# Patient Record
Sex: FEMALE | Race: WHITE | HISPANIC OR LATINO
[De-identification: names, ages, dates, MRNs, and addresses within clinical notes are randomized per-mention and may not be internally consistent; named-entity substitution may affect disease eponyms.]

---

## 2023-03-09 ENCOUNTER — P2P PATIENT RECORD (OUTPATIENT)
Age: 78
End: 2023-03-09

## 2023-03-17 ENCOUNTER — OFFICE VISIT (OUTPATIENT)
Dept: URBAN - METROPOLITAN AREA CLINIC 60 | Facility: CLINIC | Age: 78
End: 2023-03-17
Payer: MEDICARE

## 2023-03-17 ENCOUNTER — WEB ENCOUNTER (OUTPATIENT)
Dept: URBAN - METROPOLITAN AREA CLINIC 60 | Facility: CLINIC | Age: 78
End: 2023-03-17

## 2023-03-17 VITALS
SYSTOLIC BLOOD PRESSURE: 140 MMHG | OXYGEN SATURATION: 98 % | RESPIRATION RATE: 18 BRPM | HEIGHT: 62 IN | TEMPERATURE: 98.9 F | HEART RATE: 114 BPM | DIASTOLIC BLOOD PRESSURE: 80 MMHG | WEIGHT: 248 LBS | BODY MASS INDEX: 45.64 KG/M2

## 2023-03-17 DIAGNOSIS — K92.1 HEMATOCHEZIA: ICD-10-CM

## 2023-03-17 DIAGNOSIS — K62.5 RECTAL BLEEDING: ICD-10-CM

## 2023-03-17 PROCEDURE — 99244 OFF/OP CNSLTJ NEW/EST MOD 40: CPT | Performed by: NURSE PRACTITIONER

## 2023-03-17 PROCEDURE — 99204 OFFICE O/P NEW MOD 45 MIN: CPT | Performed by: NURSE PRACTITIONER

## 2023-03-17 RX ORDER — LEVOTHYROXINE SODIUM 0.03 MG/1
TAKE 1 TABLET BY MOUTH EVERY DAY TABLET ORAL
Qty: 90 EACH | Refills: 0 | Status: ACTIVE | COMMUNITY

## 2023-03-17 RX ORDER — AMLODIPINE BESYLATE 5 MG/1
TABLET ORAL
Qty: 180 TABLET | Status: ACTIVE | COMMUNITY

## 2023-03-17 RX ORDER — METFORMIN ER 500 MG 500 MG/1
TABLET ORAL
Qty: 90 TABLET | Status: ACTIVE | COMMUNITY

## 2023-03-17 RX ORDER — TAMOXIFEN CITRATE 10 MG/1
TAKE 1/2 TABLET BY MOUTH EVERY DAY TABLET, FILM COATED ORAL
Qty: 42 EACH | Refills: 3 | Status: ACTIVE | COMMUNITY

## 2023-03-17 NOTE — PHYSICAL EXAM CHEST:
No lesions,  no deformities, chest wall non-tender,  no masses present, breathing is unlabored without, normal breath sounds.  , No lesions,  no deformities, chest wall non-tender,  no masses present, breathing is unlabored without, normal breath sounds.

## 2023-03-17 NOTE — PHYSICAL EXAM HENT:
Head,  normocephalic,  atraumatic,  Face, within normal limits,  Ears,  External ears within normal limits,  Nose/Nasopharynx,  External nose  normal appearance, no nasal discharge,  Mouth and Throat,  Oral cavity appearance normal. Mucosa normal. Lips,  Appearance normal  , Head,  normocephalic,  atraumatic,  Face, within normal limits,  Ears,  External ears within normal limits,  Nose/Nasopharynx,  External nose  normal appearance, no nasal discharge,  Mouth and Throat,  Oral cavity appearance normal. Mucosa normal. Lips,  Appearance normal

## 2023-03-17 NOTE — PHYSICAL EXAM GASTROINTESTINAL
Abdomen: Soft, nontender, none distended, no guarding or rigidity, no masses palpable, normal bowel sounds, no hepatosplenomegaly. , Abdomen: Soft, nontender, none distended, no guarding or rigidity, no masses palpable, normal bowel sounds, no hepatosplenomegaly.

## 2023-03-17 NOTE — PHYSICAL EXAM EYES:
Conjunctivae and eyelids appear normal, Sclerae: White without injection or jaundice. , Conjunctivae and eyelids appear normal, Sclerae: White without injection or jaundice.

## 2023-03-17 NOTE — PHYSICAL EXAM NECK/THYROID:
Normal appearance, without tenderness upon palpation, no deformities, trachea midline, no masses , thyroid nontender. , Normal appearance, without tenderness upon palpation, no deformities, trachea midline, no masses , thyroid nontender.

## 2023-03-17 NOTE — HPI-HPI
3/23 Patient here today as a hospital follow-up.  Patient presents awake and going to the hospital complaining of having rectal bleeding.  She states she is moving her bowels and a.m. and 1 hour later. She noticed no stool but red fresh blood with some clots.  She had a CT scan abdomen at the hospital that was negative for any acute intra-abdominal process or GI tract bleeding.  Patient denies any further evidence of GI bleeding.  Last colonoscopy was done more than 5 years ago.  He denies constipation or hemorrhoids irritation.  Spoke with her daughter on phone who stated patient has medical history of pelvis and right breast cancer. Procedure will be done by the hospital since patient elevated BMI.

## 2023-03-17 NOTE — PHYSICAL EXAM CARDIOVASCULAR:
No edema,  no murmurs,  regular rate and rhythm. S1 and S2 present and normal , No edema,  no murmurs,  regular rate and rhythm. S1 and S2 present and normal

## 2023-03-22 ENCOUNTER — OFFICE VISIT (OUTPATIENT)
Dept: URBAN - METROPOLITAN AREA CLINIC 60 | Facility: CLINIC | Age: 78
End: 2023-03-22

## 2023-03-24 ENCOUNTER — LAB OUTSIDE AN ENCOUNTER (OUTPATIENT)
Dept: URBAN - METROPOLITAN AREA CLINIC 60 | Facility: CLINIC | Age: 78
End: 2023-03-24

## 2023-05-30 ENCOUNTER — OFFICE VISIT (OUTPATIENT)
Dept: URBAN - METROPOLITAN AREA MEDICAL CENTER 14 | Facility: MEDICAL CENTER | Age: 78
End: 2023-05-30

## 2023-06-09 ENCOUNTER — P2P PATIENT RECORD (OUTPATIENT)
Age: 78
End: 2023-06-09

## 2023-06-21 ENCOUNTER — OFFICE VISIT (OUTPATIENT)
Dept: URBAN - METROPOLITAN AREA CLINIC 60 | Facility: CLINIC | Age: 78
End: 2023-06-21
Payer: MEDICARE

## 2023-06-21 ENCOUNTER — DASHBOARD ENCOUNTERS (OUTPATIENT)
Age: 78
End: 2023-06-21

## 2023-06-21 VITALS
WEIGHT: 239 LBS | BODY MASS INDEX: 43.98 KG/M2 | SYSTOLIC BLOOD PRESSURE: 132 MMHG | TEMPERATURE: 97.7 F | OXYGEN SATURATION: 98 % | HEART RATE: 99 BPM | DIASTOLIC BLOOD PRESSURE: 80 MMHG | RESPIRATION RATE: 20 BRPM | HEIGHT: 62 IN

## 2023-06-21 DIAGNOSIS — K92.2 GASTRIC HEMORRHAGE: ICD-10-CM

## 2023-06-21 DIAGNOSIS — K62.5 RECTAL BLEEDING: ICD-10-CM

## 2023-06-21 PROCEDURE — 99214 OFFICE O/P EST MOD 30 MIN: CPT | Performed by: NURSE PRACTITIONER

## 2023-06-21 RX ORDER — LEVOTHYROXINE SODIUM 0.03 MG/1
TAKE 1 TABLET BY MOUTH EVERY DAY TABLET ORAL
Qty: 90 EACH | Refills: 0 | Status: ACTIVE | COMMUNITY

## 2023-06-21 RX ORDER — SUCRALFATE 1 G/1
1 TABLET ON AN EMPTY STOMACH TABLET ORAL TWICE A DAY
Status: ACTIVE | COMMUNITY

## 2023-06-21 RX ORDER — AMLODIPINE BESYLATE 5 MG/1
TABLET ORAL
Qty: 180 TABLET | Status: ACTIVE | COMMUNITY

## 2023-06-21 RX ORDER — TAMOXIFEN CITRATE 10 MG/1
TAKE 1/2 TABLET BY MOUTH EVERY DAY TABLET, FILM COATED ORAL
Qty: 42 EACH | Refills: 3 | Status: ACTIVE | COMMUNITY

## 2023-06-21 RX ORDER — PANTOPRAZOLE SODIUM 40 MG/1
1 TABLET TABLET, DELAYED RELEASE ORAL ONCE A DAY
Status: ACTIVE | COMMUNITY

## 2023-06-21 RX ORDER — METFORMIN ER 500 MG 500 MG/1
TABLET ORAL
Qty: 90 TABLET | Status: ACTIVE | COMMUNITY

## 2023-06-21 NOTE — HPI-HPI
3/23 Patient here today as a hospital follow-up.  Patient presents awake and going to the hospital complaining of having rectal bleeding.  She states she is moving her bowels and a.m. and 1 hour later. She noticed no stool but red fresh blood with some clots.  She had a CT scan abdomen at the hospital that was negative for any acute intra-abdominal process or GI tract bleeding.  Patient denies any further evidence of GI bleeding.  Last colonoscopy was done more than 5 years ago.  He denies constipation or hemorrhoids irritation.  Spoke with her daughter on phone who stated patient has medical history of pelvis and right breast cancer. Procedure will be done by the hospital since patient elevated BMI. 6/23 Patient here today as a hospital follow-up.  Patient was admitted to the hospital recently with a complaint of rectal bleeding and anemia EGD was performed, hemorrhagic polyps were found oozing fresh blood, cauterization and Hemoclip were placed.  Patient was sent home after several days in the hospital to EGD a sigmoidoscopy, and for blood transfusions.  Today she is complaining of rectal bleeding.  The rectal bleeding started again 3 days ago. The rectal bleeding was this morning, as per her report large red blood amount.  Patient is hemodynamically stable at this time, denies any shortness of breath or chest pain.  I am going to refer her to the hospital for evaluation and treatment.